# Patient Record
Sex: FEMALE | Race: WHITE | URBAN - METROPOLITAN AREA
[De-identification: names, ages, dates, MRNs, and addresses within clinical notes are randomized per-mention and may not be internally consistent; named-entity substitution may affect disease eponyms.]

---

## 2024-07-18 ENCOUNTER — OFFICE VISIT (OUTPATIENT)
Dept: ENDOCRINOLOGY | Age: 59
End: 2024-07-18
Payer: COMMERCIAL

## 2024-07-18 VITALS
WEIGHT: 138 LBS | HEART RATE: 75 BPM | BODY MASS INDEX: 22.44 KG/M2 | OXYGEN SATURATION: 98 % | DIASTOLIC BLOOD PRESSURE: 76 MMHG | SYSTOLIC BLOOD PRESSURE: 120 MMHG

## 2024-07-18 DIAGNOSIS — E27.8 ADRENAL NODULE (HCC): Primary | ICD-10-CM

## 2024-07-18 PROCEDURE — 99204 OFFICE O/P NEW MOD 45 MIN: CPT | Performed by: INTERNAL MEDICINE

## 2024-07-18 RX ORDER — DEXAMETHASONE 1 MG
TABLET ORAL
Qty: 1 TABLET | Refills: 1 | Status: SHIPPED | OUTPATIENT
Start: 2024-07-18

## 2024-07-18 ASSESSMENT — ENCOUNTER SYMPTOMS
CONSTIPATION: 1
DIARRHEA: 1

## 2024-07-18 NOTE — PROGRESS NOTES
RAISA Burrows MD, FACE    [facility] ENDOCRINOLOGY   AND   THYROID NODULE CLINIC            Reason for visit: Gale Monreal is referred by Drea Meyers PA-C for the evaluation and management of an adrenal nodule.  The patient is referred urgently.        ASSESSMENT AND PLAN:    1. Adrenal nodule (HCC)  Ms. Monreal has an incidentally-noted left adrenal nodule with indeterminate imaging phenotype.  I will arrange dedicated adrenal CT.  I will also arrange biochemical testing for hormonal hypersecretion (low-dose overnight dexamethasone suppression test, aldosterone/renin, fractionated plasma metanephrines).  If CT is reassuring and biochemical testing is normal, she can return in 1 year for repeat CT.  - Cortisol, Baseline; Future  - Dexamethasone; Future  - Aldosterone; Future  - Renin; Future  - Metanephrines Plasma Free; Future  - Comprehensive Metabolic Panel; Future  - dexAMETHasone (DECADRON) 1 MG tablet; Take tablet at 11:00 PM the evening before 8:00 AM labwork  Dispense: 1 tablet; Refill: 1  - CT ABDOMEN W WO CONTRAST; Future      Follow-up and Dispositions    Return in about 1 year (around 2025).         History of Present Illness:    ADRENAL PROBLEM  Gale Monreal is referred for evaluation of an adrenal nodule.    Current symptoms: See review of systems below    Comorbidities:  -No hypertension  -No hypokalemia  -No prior or active malignancy    Prior treatment: None    Pertinent labs: None    [id number]: CT abdomen and pelvis with contrast ([facility])- 1 cm left adrenal nodule.    Review of Systems   Constitutional:  Positive for fatigue and unexpected weight change (gained ~20 pounds in ~2 years).   Cardiovascular:  Negative for palpitations.   Gastrointestinal:  Positive for constipation and diarrhea.   Endocrine: Positive for heat intolerance (especially at night). Negative for cold intolerance.   Genitourinary:  Negative for menstrual problem (LMP

## 2024-08-01 DIAGNOSIS — E27.8 ADRENAL NODULE (HCC): ICD-10-CM

## 2024-08-01 LAB
ALBUMIN SERPL-MCNC: 4.6 G/DL (ref 3.5–5)
ALBUMIN/GLOB SERPL: 1.6 (ref 1–1.9)
ALP SERPL-CCNC: 73 U/L (ref 35–104)
ALT SERPL-CCNC: 26 U/L (ref 12–65)
ANION GAP SERPL CALC-SCNC: 12 MMOL/L (ref 9–18)
AST SERPL-CCNC: 27 U/L (ref 15–37)
BILIRUB SERPL-MCNC: 0.5 MG/DL (ref 0–1.2)
BUN SERPL-MCNC: 18 MG/DL (ref 6–23)
CALCIUM SERPL-MCNC: 9.9 MG/DL (ref 8.8–10.2)
CHLORIDE SERPL-SCNC: 103 MMOL/L (ref 98–107)
CO2 SERPL-SCNC: 26 MMOL/L (ref 20–28)
CORTIS BS SERPL-MCNC: 0.8 UG/DL
CREAT SERPL-MCNC: 0.64 MG/DL (ref 0.6–1.1)
GLOBULIN SER CALC-MCNC: 2.8 G/DL (ref 2.3–3.5)
GLUCOSE SERPL-MCNC: 108 MG/DL (ref 70–99)
POTASSIUM SERPL-SCNC: 4.7 MMOL/L (ref 3.5–5.1)
PROT SERPL-MCNC: 7.4 G/DL (ref 6.3–8.2)
SODIUM SERPL-SCNC: 141 MMOL/L (ref 136–145)

## 2024-08-06 LAB
METANEPH FREE SERPL-MCNC: <25 PG/ML (ref 0–88)
NORMETANEPHRINE SERPL-MCNC: 62.1 PG/ML (ref 0–244)

## 2024-08-08 LAB — RENIN PLAS-CCNC: 3.24 NG/ML/HR (ref 0.17–5.38)

## 2024-08-10 LAB — DEXAMETHASONE SERPL-MCNC: 415 NG/DL
